# Patient Record
Sex: MALE | Race: WHITE | Employment: FULL TIME | URBAN - METROPOLITAN AREA
[De-identification: names, ages, dates, MRNs, and addresses within clinical notes are randomized per-mention and may not be internally consistent; named-entity substitution may affect disease eponyms.]

---

## 2024-07-17 ENCOUNTER — HOSPITAL ENCOUNTER (EMERGENCY)
Facility: HOSPITAL | Age: 54
Discharge: HOME | End: 2024-07-17
Attending: EMERGENCY MEDICINE
Payer: COMMERCIAL

## 2024-07-17 ENCOUNTER — HOSPITAL ENCOUNTER (EMERGENCY)
Facility: HOSPITAL | Age: 54
Discharge: STILL ADMITTED OR ADMINISTRATIVE | End: 2024-07-17

## 2024-07-17 VITALS
RESPIRATION RATE: 14 BRPM | WEIGHT: 276 LBS | HEART RATE: 75 BPM | BODY MASS INDEX: 37.38 KG/M2 | DIASTOLIC BLOOD PRESSURE: 98 MMHG | OXYGEN SATURATION: 100 % | SYSTOLIC BLOOD PRESSURE: 179 MMHG | HEIGHT: 72 IN | TEMPERATURE: 97 F

## 2024-07-17 DIAGNOSIS — I10 PRIMARY HYPERTENSION: Primary | ICD-10-CM

## 2024-07-17 DIAGNOSIS — M10.9 ACUTE GOUT OF RIGHT FOOT, UNSPECIFIED CAUSE: ICD-10-CM

## 2024-07-17 LAB
ALBUMIN SERPL-MCNC: 4.3 G/DL (ref 3.2–4.8)
ALBUMIN/GLOB SERPL: 1.3 {RATIO} (ref 1–2)
ALP LIVER SERPL-CCNC: 82 U/L
ALT SERPL-CCNC: 12 U/L
ANION GAP SERPL CALC-SCNC: 7 MMOL/L (ref 0–18)
AST SERPL-CCNC: 15 U/L (ref ?–34)
ATRIAL RATE: 65 BPM
BASOPHILS # BLD AUTO: 0.05 X10(3) UL (ref 0–0.2)
BASOPHILS NFR BLD AUTO: 0.7 %
BILIRUB SERPL-MCNC: 1.4 MG/DL (ref 0.3–1.2)
BUN BLD-MCNC: 18 MG/DL (ref 9–23)
BUN/CREAT SERPL: 10.5 (ref 10–20)
CALCIUM BLD-MCNC: 9.4 MG/DL (ref 8.7–10.4)
CHLORIDE SERPL-SCNC: 108 MMOL/L (ref 98–112)
CO2 SERPL-SCNC: 29 MMOL/L (ref 21–32)
CREAT BLD-MCNC: 1.72 MG/DL
DEPRECATED RDW RBC AUTO: 45.1 FL (ref 35.1–46.3)
EGFRCR SERPLBLD CKD-EPI 2021: 47 ML/MIN/1.73M2 (ref 60–?)
EOSINOPHIL # BLD AUTO: 0.15 X10(3) UL (ref 0–0.7)
EOSINOPHIL NFR BLD AUTO: 2 %
ERYTHROCYTE [DISTWIDTH] IN BLOOD BY AUTOMATED COUNT: 14.6 % (ref 11–15)
GLOBULIN PLAS-MCNC: 3.4 G/DL (ref 2–3.5)
GLUCOSE BLD-MCNC: 87 MG/DL (ref 70–99)
HCT VFR BLD AUTO: 39.2 %
HGB BLD-MCNC: 13.3 G/DL
IMM GRANULOCYTES # BLD AUTO: 0.03 X10(3) UL (ref 0–1)
IMM GRANULOCYTES NFR BLD: 0.4 %
LYMPHOCYTES # BLD AUTO: 1.67 X10(3) UL (ref 1–4)
LYMPHOCYTES NFR BLD AUTO: 22.3 %
MCH RBC QN AUTO: 29 PG (ref 26–34)
MCHC RBC AUTO-ENTMCNC: 33.9 G/DL (ref 31–37)
MCV RBC AUTO: 85.6 FL
MONOCYTES # BLD AUTO: 0.43 X10(3) UL (ref 0.1–1)
MONOCYTES NFR BLD AUTO: 5.7 %
NEUTROPHILS # BLD AUTO: 5.15 X10 (3) UL (ref 1.5–7.7)
NEUTROPHILS # BLD AUTO: 5.15 X10(3) UL (ref 1.5–7.7)
NEUTROPHILS NFR BLD AUTO: 68.9 %
OSMOLALITY SERPL CALC.SUM OF ELEC: 299 MOSM/KG (ref 275–295)
P AXIS: 20 DEGREES
P-R INTERVAL: 194 MS
PLATELET # BLD AUTO: 168 10(3)UL (ref 150–450)
POTASSIUM SERPL-SCNC: 3.6 MMOL/L (ref 3.5–5.1)
PROT SERPL-MCNC: 7.7 G/DL (ref 5.7–8.2)
Q-T INTERVAL: 434 MS
QRS DURATION: 106 MS
QTC CALCULATION (BEZET): 451 MS
R AXIS: 51 DEGREES
RBC # BLD AUTO: 4.58 X10(6)UL
SODIUM SERPL-SCNC: 144 MMOL/L (ref 136–145)
T AXIS: 41 DEGREES
VENTRICULAR RATE: 65 BPM
WBC # BLD AUTO: 7.5 X10(3) UL (ref 4–11)

## 2024-07-17 PROCEDURE — 96374 THER/PROPH/DIAG INJ IV PUSH: CPT

## 2024-07-17 PROCEDURE — 80053 COMPREHEN METABOLIC PANEL: CPT

## 2024-07-17 PROCEDURE — 99284 EMERGENCY DEPT VISIT MOD MDM: CPT

## 2024-07-17 PROCEDURE — 85025 COMPLETE CBC W/AUTO DIFF WBC: CPT

## 2024-07-17 PROCEDURE — 93005 ELECTROCARDIOGRAM TRACING: CPT

## 2024-07-17 PROCEDURE — 85025 COMPLETE CBC W/AUTO DIFF WBC: CPT | Performed by: EMERGENCY MEDICINE

## 2024-07-17 PROCEDURE — 96375 TX/PRO/DX INJ NEW DRUG ADDON: CPT

## 2024-07-17 PROCEDURE — 93010 ELECTROCARDIOGRAM REPORT: CPT

## 2024-07-17 PROCEDURE — 80053 COMPREHEN METABOLIC PANEL: CPT | Performed by: EMERGENCY MEDICINE

## 2024-07-17 RX ORDER — METOPROLOL SUCCINATE 100 MG/1
100 TABLET, EXTENDED RELEASE ORAL DAILY
Qty: 30 TABLET | Refills: 0 | Status: SHIPPED | OUTPATIENT
Start: 2024-07-17 | End: 2024-08-16

## 2024-07-17 RX ORDER — COLCHICINE 0.6 MG/1
1.2 TABLET ORAL ONCE
Status: COMPLETED | OUTPATIENT
Start: 2024-07-17 | End: 2024-07-17

## 2024-07-17 RX ORDER — AMLODIPINE BESYLATE 10 MG/1
10 TABLET ORAL DAILY
Qty: 30 TABLET | Refills: 0 | Status: SHIPPED | OUTPATIENT
Start: 2024-07-17 | End: 2024-08-16

## 2024-07-17 RX ORDER — COLCHICINE 0.6 MG/1
TABLET ORAL
Qty: 20 TABLET | Refills: 0 | Status: SHIPPED | OUTPATIENT
Start: 2024-07-17

## 2024-07-17 RX ORDER — METOPROLOL TARTRATE 1 MG/ML
5 INJECTION, SOLUTION INTRAVENOUS ONCE
Status: COMPLETED | OUTPATIENT
Start: 2024-07-17 | End: 2024-07-17

## 2024-07-17 RX ORDER — AMLODIPINE BESYLATE 5 MG/1
10 TABLET ORAL ONCE
Status: COMPLETED | OUTPATIENT
Start: 2024-07-17 | End: 2024-07-17

## 2024-07-17 RX ORDER — KETOROLAC TROMETHAMINE 15 MG/ML
15 INJECTION, SOLUTION INTRAMUSCULAR; INTRAVENOUS ONCE
Status: COMPLETED | OUTPATIENT
Start: 2024-07-17 | End: 2024-07-17

## 2024-07-17 RX ORDER — HYDRALAZINE HYDROCHLORIDE 20 MG/ML
10 INJECTION INTRAMUSCULAR; INTRAVENOUS ONCE
Status: COMPLETED | OUTPATIENT
Start: 2024-07-17 | End: 2024-07-17

## 2024-07-17 RX ORDER — ALLOPURINOL 300 MG/1
300 TABLET ORAL DAILY
Qty: 30 TABLET | Refills: 0 | Status: SHIPPED | OUTPATIENT
Start: 2024-07-17 | End: 2024-08-16

## 2024-07-17 NOTE — ED INITIAL ASSESSMENT (HPI)
Pt presents to the ED with c/o elevated blood pressure. Pt presented to the pcp, and was told to come to the ED for eval of elevated blood pressure. Pt reports being off of his medications for three months. Denies chest pain/shortness of breath.

## 2024-07-17 NOTE — ED QUICK NOTES
Pt ambulatory to ED tx room 21 from triage.  Pt A&O x 4, answering all ?s appropriately.   Pt connected to cont ECG, pulse ox & BP.   Pt here w/ c/o: High blood pressure.  [place]  Pt reporting he ran out of his BP meds when he moved and was trying to find a new PCP.  Pt went to his PCP appt today and was hypertensive.  Pt was told to come to ED for further eval.  Pt reporting slight HA, denies chest pain, dizziness, vision changes, numbness/tingling.   20g L AC PIV inserted, flushes easily w/ 10cc NS, no s/s infiltration, secured w/ Tegaderm & tape.   Labs collected, labeled & bedside, verified using 2 pt identifiers, & sent to lab per standing orders.  Cart in low/locked position, side rails up x 2, call light w/ in reach.

## 2024-07-17 NOTE — DISCHARGE INSTRUCTIONS
Start taking medications as prescribed.    See primary care for follow-up in 1 week.    Return to the ER if you develop worsening symptoms, fever, chest pain or pressure, shortness of breath, weakness or numbness on one side of the body, sudden severe headache, slurred speech, or any emergent concerns.

## 2024-07-17 NOTE — ED PROVIDER NOTES
Patient Seen in: [facility] Emergency Department      History     Chief Complaint   Patient presents with    Hypertension     Stated Complaint: hypertension    Subjective:   HPI    53-year-old male with history of hypertension and gout presents for evaluation of high blood pressure.  Patient recently moved, has been out of his medications for the past 2 to 3 months.  Went to primary care visit today to establish care and get refills on his meds.  He was hypertensive at the office.  He has had right foot pain due to gout, otherwise has been in his usual state of health.  Denies chest pain or pressure, shortness of breath, focal weakness or numbness, severe headache, slurred speech.  Was previously on metoprolol 100 mg, amlodipine 10 mg, allopurinol 300 mg.    Objective:   Past Medical History:    Essential hypertension    Gout              Past Surgical History:   Procedure Laterality Date    Abdominal surgery      Removal gallbladder      Vasectomy                  No pertinent social history.            Review of Systems    Positive for stated Chief Complaint: Hypertension    Other systems are as noted in HPI.  Constitutional and vital signs reviewed.      All other systems reviewed and negative except as noted above.    Physical Exam     ED Triage Vitals [07/17/24 1314]   BP (!) 209/112   Pulse 65   Resp 18   Temp 96.5 °F (35.8 °C)   Temp src Temporal   SpO2 96 %   O2 Device None (Room air)       Current Vitals:   Vital Signs  BP: (!) 179/98  Pulse: 75  Resp: 14  Temp: 96.5 °F (35.8 °C)  Temp src: Temporal  MAP (mmHg): (!) 121    Oxygen Therapy  SpO2: 100 %  O2 Device: None (Room air)            Physical Exam  Vitals and nursing note reviewed.   Constitutional:       General: He is not in acute distress.     Appearance: He is well-developed.   HENT:      Head: Normocephalic and atraumatic.   Eyes:      Extraocular Movements: Extraocular movements intact.      Conjunctiva/sclera: Conjunctivae normal.       Pupils: Pupils are equal, round, and reactive to light.   Cardiovascular:      Rate and Rhythm: Normal rate and regular rhythm.      Heart sounds: Normal heart sounds.   Pulmonary:      Effort: Pulmonary effort is normal. No respiratory distress.      Breath sounds: Normal breath sounds.   Abdominal:      General: Bowel sounds are normal.      Palpations: Abdomen is soft.      Tenderness: There is no abdominal tenderness.   Musculoskeletal:         General: Normal range of motion.      Cervical back: Normal range of motion and neck supple.   Skin:     General: Skin is warm and dry.      Findings: No rash.   Neurological:      General: No focal deficit present.      Mental Status: He is alert and oriented to person, place, and time.      Cranial Nerves: No cranial nerve deficit.      Sensory: No sensory deficit.      Motor: No weakness.               ED Course     Labs Reviewed   COMP METABOLIC PANEL (14) - Abnormal; Notable for the following components:       Result Value    Creatinine 1.72 (*)     Calculated Osmolality 299 (*)     eGFR-Cr 47 (*)     Bilirubin, Total 1.4 (*)     All other components within normal limits   CBC WITH DIFFERENTIAL WITH PLATELET    Narrative:     The following orders were created for panel order CBC With Differential With Platelet.  Procedure                               Abnormality         Status                     ---------                               -----------         ------                     CBC W/ DIFFERENTIAL[[id number]]                              Final result                 Please view results for these tests on the individual orders.   RAINBOW DRAW BLUE   CBC W/ DIFFERENTIAL     EKG    Rate, intervals and axes as noted on EKG Report.  Rate: 65  Rhythm: Sinus Rhythm  Reading: Sinus rhythm, no STEMI                          MDM                                         Medical Decision Making  Patient with asymptomatic hypertension, noted to have elevated creatinine,  discussed this with the patient and encourage close outpatient follow-up.  Blood pressure did improve with medication in the emergency department, patient remains asymptomatic.  Feeling better after medication for gout.  Discharged with instructions for close outpatient follow-up, refills on patient's prescription as well as colchicine for acute gout and return precautions.  Patient and partner at the bedside verbalized understanding of and agreement with this plan.    Problems Addressed:  Acute gout of right foot, unspecified cause: acute illness or injury  Primary hypertension: chronic illness or injury with exacerbation, progression, or side effects of treatment    Amount and/or Complexity of Data Reviewed  External Data Reviewed: labs.     Details: Creatinine 1.05 on 7/14/2023  Labs: ordered.  ECG/medicine tests: ordered and independent interpretation performed. Decision-making details documented in ED Course.    Risk  Prescription drug management.        Disposition and Plan     Clinical Impression:  1. Primary hypertension    2. Acute gout of right foot, unspecified cause         Disposition:  Discharge  7/17/2024  4:52 pm    Follow-up:  Valentín Fisher DO  [address] [address]  [phone]    Follow up  As needed          Medications Prescribed:  Discharge Medication List as of 7/17/2024  5:01 PM        START taking these medications    Details   metoprolol succinate  MG Oral Tablet 24 Hr Take 1 tablet (100 mg total) by mouth daily., Normal, Disp-30 tablet, R-0      amLODIPine 10 MG Oral Tab Take 1 tablet (10 mg total) by mouth daily., Normal, Disp-30 tablet, R-0      allopurinol 300 MG Oral Tab Take 1 tablet (300 mg total) by mouth daily., Normal, Disp-30 tablet, R-0      colchicine 0.6 MG Oral Tab Take 0.6 mg 1-2 times daily until flare resolves, Normal, Disp-20 tablet, R-0